# Patient Record
Sex: FEMALE | URBAN - METROPOLITAN AREA
[De-identification: names, ages, dates, MRNs, and addresses within clinical notes are randomized per-mention and may not be internally consistent; named-entity substitution may affect disease eponyms.]

---

## 2024-02-15 ENCOUNTER — OFFICE VISIT (OUTPATIENT)
Dept: ORTHOPEDIC SURGERY | Age: 34
End: 2024-02-15

## 2024-02-15 DIAGNOSIS — S92.503A CLOSED FRACTURE OF PHALANX OF FOURTH TOE: ICD-10-CM

## 2024-02-15 DIAGNOSIS — M79.671 RIGHT FOOT PAIN: Primary | ICD-10-CM

## 2024-02-15 RX ORDER — ALBUTEROL SULFATE 90 UG/1
2 AEROSOL, METERED RESPIRATORY (INHALATION)
COMMUNITY

## 2024-02-15 RX ORDER — AMOXICILLIN AND CLAVULANATE POTASSIUM 500; 125 MG/1; MG/1
1 TABLET, FILM COATED ORAL 3 TIMES DAILY
COMMUNITY
Start: 2021-12-01

## 2024-02-15 NOTE — PROGRESS NOTES
tablet Take 1 tablet by mouth every 12 hours as needed for Nausea. 12 tablet 0    montelukast (SINGULAIR) 10 MG tablet Take 10 mg by mouth nightly.      lansoprazole (PREVACID) 15 MG capsule Take 15 mg by mouth daily.      Multiple Vitamins-Minerals (THERAPEUTIC MULTIVITAMIN-MINERALS) tablet Take 1 tablet by mouth daily.       No current facility-administered medications for this visit.       Allergies   Allergen Reactions    Cefaclor Hives and Rash    Sulfa Antibiotics Hives and Rash    Ceclor [Cefaclor]     Sulfa Antibiotics        Vital signs:  There were no vitals taken for this visit.     right foot examination:    Inspection: There is normal alignment.  No swelling or ecchymosis is present.     Gait: Patient is able to weight-bear with some pain over fourth to.    Range of motion painful motion of fourth toe.      Stability: No instability is present.      Strength: Normal strength is present.       Palpation: tender over 4th digit    Neurovascular: Skin is warm and well-perfused. Sensation normal.    Left foot comparison exam:    Inspection: There is normal alignment.  No swelling or ecchymosis is present.     Gait: Patient is able to weight-bear without pain.      Range of motion: Patient can perform a toe rise without pain. There is full range of motion of the ankle, midfoot, hindfoot and forefoot.      Stability: No instability is present.      Strength: Normal strength is present.       Palpation: There is no focal tenderness.      Neurovascular: Skin is warm and well-perfused. Sensation normal.      Diagnostics:  Radiology:       Pertinent imaging was obtained, interpreted, and reviewed with the patient today, both images and report.     Outside imaging was reviewed of the left ankle demonstrating a small nondisplaced fourth middle phalangeal fracture.    Office Procedures:  Orders Placed This Encounter   Procedures    Dylon / Petra Short Walking Boot     Patient was prescribed a Aprilg Short Madonna

## 2024-03-14 ENCOUNTER — OFFICE VISIT (OUTPATIENT)
Dept: ORTHOPEDIC SURGERY | Age: 34
End: 2024-03-14

## 2024-03-14 DIAGNOSIS — S92.503A CLOSED FRACTURE OF PHALANX OF FOURTH TOE: Primary | ICD-10-CM

## 2024-03-14 PROCEDURE — 99213 OFFICE O/P EST LOW 20 MIN: CPT | Performed by: PHYSICIAN ASSISTANT

## 2024-03-14 NOTE — PROGRESS NOTES
Chief Complaint  Follow-up (Right foot )      History of Present Illness:  Shannan Renner is a pleasant 34 y.o. female here for follow-up regarding her right toe.  As review, January 16, 8 weeks ago she accidentally kicked a vacuum .  She had pain ever since.  She was seen at urgent care and diagnosed with a small nondisplaced fourth middle phalangeal fracture.  When I saw her I fit her with a boot cautioned her against full weightbearing over the digit.  Today patient states that she feels significantly better.  She has little to no pain over her toe.  States that she tried walking in shoes without the boot yesterday and though things were little bit sore she says that she was able to tolerate it well.      Medical History:  Patient's medications, allergies, past medical, surgical, social and family histories were reviewed and updated as appropriate.    Pain Assessment  Location of Pain: Foot  Location Modifiers: Right  Severity of Pain: 1  Quality of Pain: Aching  Duration of Pain: A few minutes  Frequency of Pain: Intermittent  Limiting Behavior: Some  Relieving Factors: Rest  Result of Injury: Yes  Work-Related Injury: No  Are there other pain locations you wish to document?: No  ROS: Review of systems reviewed from Patient History Form completed today and available in the patient's chart under the Media tab.      Pertinent items are noted in HPI  Review of systems reviewed from Patient History Form completed today and available in the patient's chart under the Media tab.       Vital Signs:  There were no vitals taken for this visit.    Right foot examination:    Inspection: There is normal alignment.  No swelling or ecchymosis is present.     Gait: Patient is able to weight-bear without pain.      Range of motion: Patient can perform a toe rise without pain. There is full range of motion of the ankle, midfoot, hindfoot and forefoot.      Stability: No instability is present.      Strength: Normal